# Patient Record
Sex: MALE | Race: WHITE | Employment: FULL TIME | URBAN - METROPOLITAN AREA
[De-identification: names, ages, dates, MRNs, and addresses within clinical notes are randomized per-mention and may not be internally consistent; named-entity substitution may affect disease eponyms.]

---

## 2020-06-16 ENCOUNTER — VIRTUAL VISIT (OUTPATIENT)
Dept: ENDOCRINOLOGY | Age: 40
End: 2020-06-16
Payer: COMMERCIAL

## 2020-06-16 PROCEDURE — 99204 OFFICE O/P NEW MOD 45 MIN: CPT | Performed by: INTERNAL MEDICINE

## 2020-06-16 RX ORDER — TRAZODONE HYDROCHLORIDE 50 MG/1
TABLET ORAL
COMMUNITY
Start: 2020-06-07

## 2020-06-16 RX ORDER — HYDROXYZINE PAMOATE 50 MG/1
CAPSULE ORAL
COMMUNITY
Start: 2020-06-08

## 2020-06-16 NOTE — PROGRESS NOTES
gait with no signs of ataxia         [x] Normal range of motion of neck          Head and neck stable, appears normal ROM, strength good    Neurological:        [x] No Facial Asymmetry (Cranial nerve 7 motor function) (limited exam to video visit)          [x] No gaze palsy                Skin:        [x] No significant exanthematous lesions or discoloration noted on facial skin                 Psychiatric:       [x] Normal Affect [x] No Hallucinations            Other pertinent observable physical exam findings-     Due to this being a TeleHealth encounter, evaluation of the following organ systems is limited: Vitals/Constitutional/EENT/Resp/CV/GI//MS/Neuro/Skin/Heme-Lymph-Imm. Services were provided through a video synchronous discussion virtually to substitute for in-person clinic visit. Lab Review  No results found for: TSH  No results found for: FREET4      Assessment: 1. Hyperthyroidism:  TFT shows normal TSH and FT3 slightly elevated. Thyroid scan normal. Previous TFT also normal. Antibodies Positive indicating auto immunity. Discussed with patient thyroid level do not explain the symptoms as FT3 just slightly elevated, will check FT4 level. Need to monitor TFT given automiimunity. R/o other hormonal cause. Antithyroid medication will not be indicated. 2.Hypercalcemia: Repeat with PTH. Vit is normal       Plan:     1. Check TFT, Trab, repeat Ab  2. Thyroid USG  3. PTH and CMP  4. Plasma Metanephrine

## 2020-06-18 ENCOUNTER — HOSPITAL ENCOUNTER (OUTPATIENT)
Dept: ULTRASOUND IMAGING | Age: 40
Discharge: HOME | End: 2020-06-18
Payer: COMMERCIAL

## 2020-06-18 LAB
A/G RATIO: 2.1 (ref 1.1–2.2)
ALBUMIN SERPL-MCNC: 5 G/DL (ref 3.4–5)
ALP BLD-CCNC: 90 U/L (ref 40–129)
ALT SERPL-CCNC: 21 U/L (ref 10–40)
ANION GAP SERPL CALCULATED.3IONS-SCNC: 10 MMOL/L (ref 3–16)
ANTI-THYROGLOB ABS: 808 IU/ML
AST SERPL-CCNC: 17 U/L (ref 15–37)
BILIRUB SERPL-MCNC: 0.3 MG/DL (ref 0–1)
BUN BLDV-MCNC: 16 MG/DL (ref 7–20)
CALCIUM SERPL-MCNC: 10 MG/DL (ref 8.3–10.6)
CHLORIDE BLD-SCNC: 101 MMOL/L (ref 99–110)
CO2: 27 MMOL/L (ref 21–32)
CREAT SERPL-MCNC: 0.9 MG/DL (ref 0.9–1.3)
GFR AFRICAN AMERICAN: >60
GFR NON-AFRICAN AMERICAN: >60
GLOBULIN: 2.4 G/DL
GLUCOSE BLD-MCNC: 93 MG/DL (ref 70–99)
PARATHYROID HORMONE INTACT: 20.1 PG/ML (ref 14–72)
POTASSIUM SERPL-SCNC: 4.7 MMOL/L (ref 3.5–5.1)
SODIUM BLD-SCNC: 138 MMOL/L (ref 136–145)
T3 FREE: 3.6 PG/ML (ref 2.3–4.2)
T4 FREE: 1.5 NG/DL (ref 0.9–1.8)
THYROID PEROXIDASE (TPO) ABS: >600 IU/ML
TOTAL PROTEIN: 7.4 G/DL (ref 6.4–8.2)
TSH REFLEX: 3.96 UIU/ML (ref 0.27–4.2)

## 2020-06-18 PROCEDURE — 83520 IMMUNOASSAY QUANT NOS NONAB: CPT

## 2020-06-18 PROCEDURE — 83835 ASSAY OF METANEPHRINES: CPT

## 2020-06-18 PROCEDURE — 84439 ASSAY OF FREE THYROXINE: CPT

## 2020-06-18 PROCEDURE — 36415 COLL VENOUS BLD VENIPUNCTURE: CPT

## 2020-06-18 PROCEDURE — 84481 FREE ASSAY (FT-3): CPT

## 2020-06-18 PROCEDURE — 84443 ASSAY THYROID STIM HORMONE: CPT

## 2020-06-18 PROCEDURE — 83970 ASSAY OF PARATHORMONE: CPT

## 2020-06-18 PROCEDURE — 86376 MICROSOMAL ANTIBODY EACH: CPT

## 2020-06-18 PROCEDURE — 76536 US EXAM OF HEAD AND NECK: CPT

## 2020-06-18 PROCEDURE — 80053 COMPREHEN METABOLIC PANEL: CPT

## 2020-06-18 PROCEDURE — 86800 THYROGLOBULIN ANTIBODY: CPT

## 2020-06-21 LAB
METANEPH/PLASMA INTERP: NORMAL
METANEPHRINE FREE PLASMA: 0.27 NMOL/L (ref 0–0.49)
NORMETANEPHRINE FREE PLASMA: 0.75 NMOL/L (ref 0–0.89)

## 2020-06-22 LAB — TSH RECEPTOR AB: 0.93 IU/L

## 2020-07-02 ENCOUNTER — HOSPITAL ENCOUNTER (OUTPATIENT)
Dept: ULTRASOUND IMAGING | Age: 40
Discharge: HOME | End: 2020-07-02
Payer: COMMERCIAL

## 2020-07-02 ENCOUNTER — HOSPITAL ENCOUNTER (OUTPATIENT)
Age: 40
Discharge: HOME | End: 2020-07-02
Payer: COMMERCIAL

## 2020-07-02 LAB
A/G RATIO: 1.7 (ref 1.1–2.2)
ALBUMIN SERPL-MCNC: 4.3 G/DL (ref 3.4–5)
ALP BLD-CCNC: 92 U/L (ref 40–129)
ALT SERPL-CCNC: 19 U/L (ref 10–40)
ANION GAP SERPL CALCULATED.3IONS-SCNC: 9 MMOL/L (ref 3–16)
AST SERPL-CCNC: 20 U/L (ref 15–37)
BILIRUB SERPL-MCNC: <0.2 MG/DL (ref 0–1)
BUN BLDV-MCNC: 19 MG/DL (ref 7–20)
CALCIUM SERPL-MCNC: 9.1 MG/DL (ref 8.3–10.6)
CHLORIDE BLD-SCNC: 102 MMOL/L (ref 99–110)
CO2: 26 MMOL/L (ref 21–32)
CREAT SERPL-MCNC: 0.9 MG/DL (ref 0.9–1.3)
GFR AFRICAN AMERICAN: >60
GFR NON-AFRICAN AMERICAN: >60
GLOBULIN: 2.5 G/DL
GLUCOSE BLD-MCNC: 104 MG/DL (ref 70–99)
PARATHYROID HORMONE INTACT: 30.7 PG/ML (ref 14–72)
POTASSIUM SERPL-SCNC: 4.5 MMOL/L (ref 3.5–5.1)
SODIUM BLD-SCNC: 137 MMOL/L (ref 136–145)
TOTAL PROTEIN: 6.8 G/DL (ref 6.4–8.2)
VITAMIN D 25-HYDROXY: 48.5 NG/ML

## 2020-07-02 PROCEDURE — 80053 COMPREHEN METABOLIC PANEL: CPT

## 2020-07-02 PROCEDURE — 88173 CYTOPATH EVAL FNA REPORT: CPT

## 2020-07-02 PROCEDURE — 82652 VIT D 1 25-DIHYDROXY: CPT

## 2020-07-02 PROCEDURE — 82542 COL CHROMOTOGRAPHY QUAL/QUAN: CPT

## 2020-07-02 PROCEDURE — 2709999900 US BIOPSY THYROID

## 2020-07-02 PROCEDURE — 84155 ASSAY OF PROTEIN SERUM: CPT

## 2020-07-02 PROCEDURE — 36415 COLL VENOUS BLD VENIPUNCTURE: CPT

## 2020-07-02 PROCEDURE — 88305 TISSUE EXAM BY PATHOLOGIST: CPT

## 2020-07-02 PROCEDURE — 82306 VITAMIN D 25 HYDROXY: CPT

## 2020-07-02 PROCEDURE — 83970 ASSAY OF PARATHORMONE: CPT

## 2020-07-02 PROCEDURE — 84165 PROTEIN E-PHORESIS SERUM: CPT

## 2020-07-02 NOTE — BRIEF OP NOTE
Brief Postoperative Note    Dangelo Montano  YOB: 1980  [id number]    Pre-operative Diagnosis: right thyroid nodule    Post-operative Diagnosis: Same    Procedure: right thyroid nodule FNA    Anesthesia: Local    Surgeons: Nica Brumfield MD    Estimated Blood Loss: Less than 5 mL    Complications: None    Specimens: Was Obtained: 4 FNA specimens    Findings: Successful US-guided right thyroid nodule FNA.     Electronically signed by Nica Brumfield MD on 7/2/2020 at 8:00 AM

## 2020-07-04 LAB
ALBUMIN SERPL-MCNC: 3.9 G/DL (ref 3.1–4.9)
ALPHA-1-GLOBULIN: 0.2 G/DL (ref 0.2–0.4)
ALPHA-2-GLOBULIN: 0.7 G/DL (ref 0.4–1.1)
BETA GLOBULIN: 1.1 G/DL (ref 0.9–1.6)
GAMMA GLOBULIN: 0.9 G/DL (ref 0.6–1.8)

## 2020-07-05 LAB — VITAMIN D 1,25-DIHYDROXY: 21.7 PG/ML (ref 19.9–79.3)

## 2020-07-06 LAB — SPE/IFE INTERPRETATION: NORMAL

## 2020-07-09 LAB — PTH RELATED PEPTIDE: <2 PMOL/L (ref 0–2.3)
